# Patient Record
(demographics unavailable — no encounter records)

---

## 2018-02-27 NOTE — ED PEDIATRIC ILLNESS
HPI-Pediatric Illness


General


Chief Complaint:  Ear Problems


Stated Complaint:  RT EAR PAIN


Nursing Triage Note:  


pt mother states rt ear pain


Source:  family (MOM)





History of Present Illness


Date Seen by Provider:  2018


Time Seen by Provider:  04:50


Initial Comments


MOM STATES CHILD WOKE UP AT 0200 "SCREAMING"  AND C/O RIGHT EAR PAIN 


NO FEVER


HAD SLIGHT CLEAR RUNNY NOSE--NOT NOW


HAS NOT GIVEN CHILD ANYTHING FOR PAIN 


NO HISTORY OF SIMILAR


BOTH PARENTS SMOKE


Other





PCP: DR. NORTON





Allergies and Home Medications


Allergies


Coded Allergies:  


     No Known Drug Allergies (Unverified , 6/1/15)





Home Medications


Amoxicillin 400 Mg/5 Ml Susp.recon, 300 MG PO BID


   Prescribed by: ADRIAN WOODARD on 18 0503





Constitutional:  see HPI, No fever


EENTM:  see HPI, ear pain, nose congestion


Respiratory:  no symptoms reported


Cardiovascular:  no symptoms reported


Gastrointestinal:  no symptoms reported


Genitourinary:  no symptoms reported


Musculoskeletal:  no symptoms reported


Skin:  no symptoms reported


Psychiatric/Neurological:  No Symptoms Reported


Endocrine:  No Symptoms Reported


Hematologic/Lymphatic:  No Symptoms Reported





PMH-Pediatrics


Complications at birth:  


BW.W. 8# 9 OZ


TERM, 


HOSPITALIZED X 4-5 DAYS IN NICU FOR 


"BREATHING PROBLEMS AND JAUNDICE"


NO VENTILATOR


Recent Foreign Travel:  No


Contact w/other who traveled:  No


Recent Infectious Disease Expo:  No


Hospitalization with Isolation:  Denies


PED Vaccines UTD:  Yes


Seasonal Allergies:  No


HX Surgeries:  No


Hx Respiratory Disorders:  No


Hx Cardiovascular Disorders:  No


Hx Neurological Disorders:  No


Hx Genitourinary Disorders:  No


Hx Gastrointestinal Disorders:  No


Hx Musculoskeletal Disorders:  No


Hx Endocrine Disorders:  No


HX ENT Disorders:  No


Hx Cancer:  No


HX Skin/Integumentary Disorder:  No


Hx Blood Disorders:  No





Physical Exam-Pediatric


Physical Exam


Vital Signs





Vital Signs - First Documented








 18





 04:50


 


Temp 97.8


 


Pulse 123


 


Resp 22


 


O2 Delivery Room Air





Capillary Refill :


General Appearance:  no acute distress, active, good eye contact, playful, 

smiles, other (COOPERATIVE. EATING GRANOLA BAR. CHILD TALKATIVE, SMILING. DOES 

NOT APPEAR TO BE IN ANY DISCOMFORT OR DISTRESS)


HENT:  head inspection normal, fontanelle closed/normal, PERRL, TM red (RIGHT 

TM VERY INFLAMED, LEFT TM DULL AND SLIGHTLY INJECTED), No nasal congestion, No 

dry mucous membranes, No rhinorrhea, pharyngeal erythema (MILD)


Neck:  non-tender, full range of motion, supple, normal inspection, No 

lymphadenopathy (R), No lymphadenopathy (L)


Respiratory:  normal breath sounds, no respiratory distress, no accessory 

muscle use


Cardiovascular:  regular rate, rhythm, no murmur


Gastrointestinal:  soft


Extremities:  normal inspection, normal capillary refill


Neurologic/Psychiatric:  CNs II-XII nml as tested, no motor/sensory deficits, 

alert, normal mood/affect


Skin:  normal color, warm/dry, No rash





Progress/Results/Core Measures


Results/Orders


Vital Signs/I&O





Vital Sign - Last 12Hours








 18





 04:50 05:09


 


Temp 97.8 97.8


 


Pulse 123 


 


Resp 22 22


 


B/P (MAP)  


 


O2 Delivery Room Air Room Air











Departure


Impression


Impression:  


 Primary Impression:  


 Right otitis media


 Additional Impression:  


 Pharyngitis


Disposition:   HOME, SELF-CARE


Condition:  Stable





Departure-Patient Inst.


Referrals:  


RENEA NORTON MD (PCP/Family)


Primary Care Physician


Patient Instructions:  Ear Infections (Otitis Media) (DC), Sore Throat, Child (

DC)





Add. Discharge Instructions:  


LOTS OF CLEAR LIQUIDS--WATER, BROTH, JELLO, PEDIALYTE, POPSICLES





TYLENOL AND MOTRIN AS NEEDED FOR PAIN OR FEVER





FOLLOW UP WITH DR. NORTON IN 1 WEEK FOR RECHECK OR SOONER IF WORSE





All discharge instructions reviewed with patient and/or family. Voiced 

understanding.


Scripts


Amoxicillin (Amoxicillin) 400 Mg/5 Ml Susp.recon


300 MG PO BID, #75 ML


   Prov: ADRIAN WOODARD DO         18











ADRIAN WOODARD DO 2018 05:03

## 2018-02-27 NOTE — XMS REPORT
Continuity of Care Document

 Created on: 2018



NEVA COREAS YWJSIAB

External Reference #: [id number]

: 2015

Sex: Male



Demographics







 Address  [address]  [address]

 

 Home Phone  [phone] x

 

 Preferred Language  Unknown

 

 Marital Status  Unknown

 

 Gnosticist Affiliation  Unknown

 

 Race  Unknown

 

 Ethnic Group  Unknown





Author







 Author  [facility]

 

 Organization  [facility]

 

 Address  Unknown

 

 Phone  Unavailable



              



Allergies

      





 Active            Description            Code            Type            
Severity            Reaction            Onset            Reported/Identified   
         Relationship to Patient            Clinical Status        

 

 Yes            No Known Drug Allergies            [id number]            Drug 
Allergy            Unknown            N/A                         2015   
                               



                  



Medications

      



There is no data.                  



Problems

      





 Date Dx Coded            Attending            Type            Code            
Diagnosis            Diagnosed By        

 

 2015            YESSY BUITRAGO MD            Ot            770.89       
                           

 

 2015            YESSY BUITRAGO MD            Ot            V30.00       
                           



                    



Procedures

      



There is no data.                  



Results

      



There is no data.              



Encounters

      





 ACCT No.            Visit Date/Time            Discharge            Status    
        Pt. Type            Provider            Facility            Loc./Unit  
          Complaint        

 

 [id number]            2015 08:33:00            2015 13:00:00    
        DIS            Inpatient            YESSY BUITRAGO MD            [facility]